# Patient Record
Sex: FEMALE | Race: BLACK OR AFRICAN AMERICAN
[De-identification: names, ages, dates, MRNs, and addresses within clinical notes are randomized per-mention and may not be internally consistent; named-entity substitution may affect disease eponyms.]

---

## 2017-04-04 ENCOUNTER — HOSPITAL ENCOUNTER (EMERGENCY)
Dept: HOSPITAL 23 - CED | Age: 22
Discharge: HOME | End: 2017-04-04
Payer: COMMERCIAL

## 2017-04-04 DIAGNOSIS — J32.9: Primary | ICD-10-CM

## 2017-04-04 DIAGNOSIS — Z88.2: ICD-10-CM

## 2017-05-05 ENCOUNTER — HOSPITAL ENCOUNTER (EMERGENCY)
Dept: HOSPITAL 23 - CED | Age: 22
Discharge: HOME | End: 2017-05-05
Payer: COMMERCIAL

## 2017-05-05 DIAGNOSIS — Z88.2: ICD-10-CM

## 2017-05-05 DIAGNOSIS — N76.0: Primary | ICD-10-CM

## 2017-05-05 DIAGNOSIS — N39.0: ICD-10-CM

## 2017-05-05 DIAGNOSIS — Z88.1: ICD-10-CM

## 2017-05-05 LAB
GENTAMICIN PEAK SERPL-MCNC: YES MG/L
URINE APPEARANCE: (no result)
URINE BACTERIA AUWI: (no result)
URINE BILIRUBIN: (no result)
URINE BLOOD: (no result)
URINE COLOR: (no result)
URINE CRYSTALS: (no result) /[HPF]
URINE GLUCOSE: (no result) MG/DL
URINE KETONE: (no result)
URINE LEUKOCYTE ESTERASE: (no result)
URINE NITRATE: (no result)
URINE PH: 5.5 (ref 5–8)
URINE PROTEIN: (no result)
URINE SOURCE: (no result)
URINE SPECIFIC GRAVITY: 1.03 (ref 1–1.03)
URINE SQUAMOUS EPITHELIAL CELL: (no result) /[HPF]
URINE UROBILINOGEN: 1 MG/DL
UWBCS1 AUWI: (no result) (ref 0–5)

## 2017-05-06 ENCOUNTER — HOSPITAL ENCOUNTER (EMERGENCY)
Dept: HOSPITAL 23 - CED | Age: 22
LOS: 1 days | Discharge: HOME | End: 2017-05-07
Payer: COMMERCIAL

## 2017-05-06 DIAGNOSIS — Z88.2: ICD-10-CM

## 2017-05-06 DIAGNOSIS — N76.0: Primary | ICD-10-CM

## 2017-05-06 DIAGNOSIS — Z88.8: ICD-10-CM

## 2017-05-06 LAB
BARBITURATES UR QL SCN: 0.9 MG/DL (ref 0.2–2)
BARBITURATES UR QL SCN: 4.1 G/DL (ref 3.5–5)
BASOPHIL#: 0 X10E3 (ref 0–0.3)
BASOPHIL%: 0.1 % (ref 0–2.5)
BENZODIAZ UR QL SCN: 16 U/L (ref 10–40)
BENZODIAZ UR QL SCN: 23 U/L (ref 10–42)
BLOOD UREA NITROGEN: 13 MG/DL (ref 9–23)
BUN/CREATININE RATIO: 14.44
BZE UR QL SCN: 64 U/L (ref 32–92)
CALCIUM SERUM: 9.1 MG/DL (ref 8.4–10.2)
CK MB SERPL-RTO: 14 % (ref 11–15.5)
CK MB SERPL-RTO: 32.5 G/DL (ref 30–36)
CREATININE SERUM: 0.9 MG/DL (ref 0.6–1.4)
DIFF IND: NO
EOSINOPHIL#: 0.2 X10E3 (ref 0–0.7)
EOSINOPHIL%: 2.2 % (ref 0–7)
GLOM FILT RATE ESTIMATED: 106 ML/MIN (ref 60–?)
GLUCOSE FASTING: 98 MG/DL (ref 70–110)
HEMATOCRIT: 41.3 % (ref 35–45)
HEMOGLOBIN: 13.4 GM/DL (ref 12–16)
KETONES UR QL: 100 MMOL/L (ref 100–111)
KETONES UR QL: 21 MMOL/L (ref 22–31)
LYMPHOCYTE#: 0.5 X10E3 (ref 1–3.5)
LYMPHOCYTE%: 5.2 % (ref 17–45)
MEAN CELL VOLUME: 77.5 FL (ref 83–96)
MEAN CORPUSCULAR HEMOGLOBIN: 25.2 PG (ref 28–34)
MEAN PLATELET VOLUME: 9 FL (ref 6.5–11.5)
MONOCYTE#: 0.4 X10E3 (ref 0–1)
MONOCYTE%: 4.2 % (ref 3–12)
NEUTROPHIL#: 8.8 X10E3 (ref 1.5–7.1)
NEUTROPHIL%: 88.3 % (ref 40–75)
PLATELET COUNT: 172 X10E3 (ref 140–420)
POTASSIUM: 3.8 MMOL/L (ref 3.5–5.1)
PROTEIN TOTAL SERUM: 7.6 G/DL (ref 6–8.3)
RED BLOOD COUNT: 5.33 X10E (ref 3.9–5.3)
SODIUM: 131 MMOL/L (ref 135–145)
WHITE BLOOD COUNT: 10 X10E3 (ref 4–10.5)

## 2018-09-14 PROCEDURE — 99284 EMERGENCY DEPT VISIT MOD MDM: CPT

## 2018-09-15 ENCOUNTER — HOSPITAL ENCOUNTER (EMERGENCY)
Facility: HOSPITAL | Age: 23
Discharge: HOME | End: 2018-09-15
Attending: EMERGENCY MEDICINE | Admitting: EMERGENCY MEDICINE

## 2018-09-15 VITALS
DIASTOLIC BLOOD PRESSURE: 81 MMHG | HEIGHT: 68 IN | RESPIRATION RATE: 18 BRPM | SYSTOLIC BLOOD PRESSURE: 107 MMHG | OXYGEN SATURATION: 100 % | WEIGHT: 138.5 LBS | TEMPERATURE: 97.8 F | HEART RATE: 73 BPM | BODY MASS INDEX: 20.99 KG/M2

## 2018-09-15 DIAGNOSIS — N75.8: ICD-10-CM

## 2018-09-15 DIAGNOSIS — R23.8 BULLAE: ICD-10-CM

## 2018-09-15 DIAGNOSIS — N89.8 VAGINAL DISCHARGE: Primary | ICD-10-CM

## 2018-09-15 LAB
ALBUMIN SERPL-MCNC: 4.2 G/DL (ref 3.5–5.2)
ALBUMIN/GLOB SERPL: 1.2 G/DL
ALP SERPL-CCNC: 63 U/L (ref 39–117)
ALT SERPL W P-5'-P-CCNC: 18 U/L (ref 1–33)
ANION GAP SERPL CALCULATED.3IONS-SCNC: 13.1 MMOL/L
AST SERPL-CCNC: 19 U/L (ref 1–32)
B-HCG UR QL: NEGATIVE
BASOPHILS # BLD AUTO: 0.03 10*3/MM3 (ref 0–0.2)
BASOPHILS NFR BLD AUTO: 0.5 % (ref 0–1.5)
BILIRUB SERPL-MCNC: 0.2 MG/DL (ref 0.1–1.2)
BILIRUB UR QL STRIP: NEGATIVE
BUN BLD-MCNC: 20 MG/DL (ref 6–20)
BUN/CREAT SERPL: 29.9 (ref 7–25)
CALCIUM SPEC-SCNC: 9.9 MG/DL (ref 8.6–10.5)
CHLORIDE SERPL-SCNC: 104 MMOL/L (ref 98–107)
CLARITY UR: CLEAR
CLUE CELLS SPEC QL WET PREP: ABNORMAL
CO2 SERPL-SCNC: 22.9 MMOL/L (ref 22–29)
COLOR UR: YELLOW
CREAT BLD-MCNC: 0.67 MG/DL (ref 0.57–1)
DEPRECATED RDW RBC AUTO: 43.3 FL (ref 37–54)
EOSINOPHIL # BLD AUTO: 0.2 10*3/MM3 (ref 0–0.7)
EOSINOPHIL NFR BLD AUTO: 3.6 % (ref 0.3–6.2)
ERYTHROCYTE [DISTWIDTH] IN BLOOD BY AUTOMATED COUNT: 14.2 % (ref 11.7–13)
GFR SERPL CREATININE-BSD FRML MDRD: 133 ML/MIN/1.73
GLOBULIN UR ELPH-MCNC: 3.6 GM/DL
GLUCOSE BLD-MCNC: 89 MG/DL (ref 65–99)
GLUCOSE UR STRIP-MCNC: NEGATIVE MG/DL
HCT VFR BLD AUTO: 42.3 % (ref 35.6–45.5)
HGB BLD-MCNC: 12.8 G/DL (ref 11.9–15.5)
HGB UR QL STRIP.AUTO: NEGATIVE
HYDATID CYST SPEC WET PREP: ABNORMAL
IMM GRANULOCYTES # BLD: 0 10*3/MM3 (ref 0–0.03)
IMM GRANULOCYTES NFR BLD: 0 % (ref 0–0.5)
KETONES UR QL STRIP: NEGATIVE
LEUKOCYTE ESTERASE UR QL STRIP.AUTO: NEGATIVE
LYMPHOCYTES # BLD AUTO: 2.19 10*3/MM3 (ref 0.9–4.8)
LYMPHOCYTES NFR BLD AUTO: 39.2 % (ref 19.6–45.3)
MCH RBC QN AUTO: 25.2 PG (ref 26.9–32)
MCHC RBC AUTO-ENTMCNC: 30.3 G/DL (ref 32.4–36.3)
MCV RBC AUTO: 83.4 FL (ref 80.5–98.2)
MONOCYTES # BLD AUTO: 0.27 10*3/MM3 (ref 0.2–1.2)
MONOCYTES NFR BLD AUTO: 4.8 % (ref 5–12)
NEUTROPHILS # BLD AUTO: 2.9 10*3/MM3 (ref 1.9–8.1)
NEUTROPHILS NFR BLD AUTO: 51.9 % (ref 42.7–76)
NITRITE UR QL STRIP: NEGATIVE
PH UR STRIP.AUTO: 6 [PH] (ref 5–8)
PLATELET # BLD AUTO: 205 10*3/MM3 (ref 140–500)
PMV BLD AUTO: 10.6 FL (ref 6–12)
POTASSIUM BLD-SCNC: 4 MMOL/L (ref 3.5–5.2)
PROT SERPL-MCNC: 7.8 G/DL (ref 6–8.5)
PROT UR QL STRIP: NEGATIVE
RBC # BLD AUTO: 5.07 10*6/MM3 (ref 3.9–5.2)
SODIUM BLD-SCNC: 140 MMOL/L (ref 136–145)
SP GR UR STRIP: >=1.03 (ref 1–1.03)
T VAGINALIS SPEC QL WET PREP: ABNORMAL
UROBILINOGEN UR QL STRIP: NORMAL
WBC NRBC COR # BLD: 5.59 10*3/MM3 (ref 4.5–10.7)
WBC SPEC QL WET PREP: ABNORMAL
YEAST GENITAL QL WET PREP: ABNORMAL

## 2018-09-15 PROCEDURE — 25010000002 CEFTRIAXONE PER 250 MG: Performed by: PHYSICIAN ASSISTANT

## 2018-09-15 PROCEDURE — 87210 SMEAR WET MOUNT SALINE/INK: CPT | Performed by: PHYSICIAN ASSISTANT

## 2018-09-15 PROCEDURE — 87591 N.GONORRHOEAE DNA AMP PROB: CPT | Performed by: PHYSICIAN ASSISTANT

## 2018-09-15 PROCEDURE — 87491 CHLMYD TRACH DNA AMP PROBE: CPT | Performed by: PHYSICIAN ASSISTANT

## 2018-09-15 PROCEDURE — 87070 CULTURE OTHR SPECIMN AEROBIC: CPT | Performed by: PHYSICIAN ASSISTANT

## 2018-09-15 PROCEDURE — 80053 COMPREHEN METABOLIC PANEL: CPT | Performed by: PHYSICIAN ASSISTANT

## 2018-09-15 PROCEDURE — 85025 COMPLETE CBC W/AUTO DIFF WBC: CPT | Performed by: PHYSICIAN ASSISTANT

## 2018-09-15 PROCEDURE — 81003 URINALYSIS AUTO W/O SCOPE: CPT | Performed by: PHYSICIAN ASSISTANT

## 2018-09-15 PROCEDURE — 81025 URINE PREGNANCY TEST: CPT | Performed by: PHYSICIAN ASSISTANT

## 2018-09-15 PROCEDURE — 87205 SMEAR GRAM STAIN: CPT | Performed by: PHYSICIAN ASSISTANT

## 2018-09-15 PROCEDURE — 96372 THER/PROPH/DIAG INJ SC/IM: CPT

## 2018-09-15 RX ORDER — METRONIDAZOLE 500 MG/1
500 TABLET ORAL 3 TIMES DAILY
Qty: 21 TABLET | Refills: 0 | Status: SHIPPED | OUTPATIENT
Start: 2018-09-15

## 2018-09-15 RX ORDER — LIDOCAINE HYDROCHLORIDE 10 MG/ML
0.9 INJECTION, SOLUTION EPIDURAL; INFILTRATION; INTRACAUDAL; PERINEURAL ONCE
Status: COMPLETED | OUTPATIENT
Start: 2018-09-15 | End: 2018-09-15

## 2018-09-15 RX ORDER — CEFTRIAXONE SODIUM 250 MG/1
250 INJECTION, POWDER, FOR SOLUTION INTRAMUSCULAR; INTRAVENOUS ONCE
Status: COMPLETED | OUTPATIENT
Start: 2018-09-15 | End: 2018-09-15

## 2018-09-15 RX ORDER — AZITHROMYCIN 250 MG/1
1000 TABLET, FILM COATED ORAL ONCE
Status: COMPLETED | OUTPATIENT
Start: 2018-09-15 | End: 2018-09-15

## 2018-09-15 RX ORDER — DOXYCYCLINE HYCLATE 100 MG/1
100 CAPSULE ORAL 2 TIMES DAILY
Qty: 20 CAPSULE | Refills: 0 | Status: SHIPPED | OUTPATIENT
Start: 2018-09-15

## 2018-09-15 RX ADMIN — AZITHROMYCIN 1000 MG: 250 TABLET, FILM COATED ORAL at 06:22

## 2018-09-15 RX ADMIN — LIDOCAINE HYDROCHLORIDE 0.9 ML: 10 INJECTION, SOLUTION EPIDURAL; INFILTRATION; INTRACAUDAL; PERINEURAL at 04:23

## 2018-09-15 RX ADMIN — CEFTRIAXONE SODIUM 250 MG: 250 INJECTION, POWDER, FOR SOLUTION INTRAMUSCULAR; INTRAVENOUS at 04:23

## 2018-09-15 NOTE — ED NOTES
"Pt reports abscess to right labia x1 day. Denies fever. Pt c/o vaginal \"milky\" discharge. Also c/o possible spider bite to left foot since yesterday. Pt states \"I went to the [facility] yesterday and they gave me cream but it's not helping.\"                Swallows, Anuradha Rai, RN  09/14/18 9518    "

## 2018-09-15 NOTE — ED PROVIDER NOTES
" EMERGENCY DEPARTMENT ENCOUNTER    CHIEF COMPLAINT  Chief Complaint: Abscess  History given by: Patient  History limited by:   Room Number: 26/26  PMD: Traci Dumont APRN      HPI:  Pt is a 23 y.o. female who presents complaining of abscess.on right labia majora that was first noticed yesterday. Pt also reports vaginal discharge that started yesterday.  Pt also reports a similar abscess on left foot. Pt denied fever. Pt denies possible STI exposure. Hx of Bacterial Vaginosis.        Duration:  One day  Onset: gradual  Timing: constant  Location: right labia majora and left foot  Radiation: none  Quality: \"pain\"  Intensity/Severity: severe  Progression: worsened  Associated Symptoms: discharge  Aggravating Factors: none  Alleviating Factors: none  Previous Episodes: none  Treatment before arrival:     PAST MEDICAL HISTORY  Active Ambulatory Problems     Diagnosis Date Noted   • No Active Ambulatory Problems     Resolved Ambulatory Problems     Diagnosis Date Noted   • No Resolved Ambulatory Problems     No Additional Past Medical History       PAST SURGICAL HISTORY  No past surgical history on file.    FAMILY HISTORY  No family history on file.    SOCIAL HISTORY  Social History     Social History   • Marital status: Single     Spouse name: N/A   • Number of children: N/A   • Years of education: N/A     Occupational History   • Not on file.     Social History Main Topics   • Smoking status: Not on file   • Smokeless tobacco: Not on file   • Alcohol use Not on file   • Drug use: Unknown   • Sexual activity: Not on file     Other Topics Concern   • Not on file     Social History Narrative   • No narrative on file       ALLERGIES  Bactrim [sulfamethoxazole-trimethoprim]    REVIEW OF SYSTEMS  Review of Systems   Constitutional: Negative for fever.   Respiratory: Negative for shortness of breath.    Cardiovascular: Negative for chest pain.   Genitourinary: Positive for vaginal discharge.        Abscess       PHYSICAL " EXAM  ED Triage Vitals   Temp Heart Rate Resp BP SpO2   09/14/18 2339 09/14/18 2339 09/14/18 2339 09/15/18 0127 09/14/18 2339   97.9 °F (36.6 °C) 103 20 116/87 100 %      Temp src Heart Rate Source Patient Position BP Location FiO2 (%)   09/15/18 0239 09/15/18 0239 -- 09/15/18 0239 --   Oral Monitor  Right arm        Physical Exam   Constitutional: She is oriented to person, place, and time. No distress.   Eyes: EOM are normal.   Neck: Normal range of motion.   Cardiovascular: Normal rate and regular rhythm.    Pulmonary/Chest: Effort normal and breath sounds normal. No respiratory distress.   Genitourinary: Cervix exhibits no motion tenderness. Thick  odorless  white and vaginal discharge found.   Genitourinary Comments: Female chaperone present during  exam. Mild tenderness to the right labia along the Bartholin's gland       Neurological: She is alert and oriented to person, place, and time. She has normal sensation and normal strength.   Skin: Skin is warm and dry.   Psychiatric: Affect normal.   Nursing note and vitals reviewed.      LAB RESULTS  Lab Results (last 24 hours)     Procedure Component Value Units Date/Time    Pregnancy, Urine - Urine, Clean Catch [[id number]]  (Normal) Collected:  09/15/18 0344    Specimen:  Urine from Urine, Clean Catch Updated:  09/15/18 0422     HCG, Urine QL Negative    Urinalysis With Microscopic If Indicated (No Culture) - Urine, Clean Catch [[id number]]  (Normal) Collected:  09/15/18 0344    Specimen:  Urine from Urine, Clean Catch Updated:  09/15/18 0422     Color, UA Yellow     Appearance, UA Clear     pH, UA 6.0     Specific Gravity, UA >=1.030     Glucose, UA Negative     Ketones, UA Negative     Bilirubin, UA Negative     Blood, UA Negative     Protein, UA Negative     Leuk Esterase, UA Negative     Nitrite, UA Negative     Urobilinogen, UA 0.2 E.U./dL    Narrative:       Urine microscopic not indicated.    CBC & Differential [[id number]] Collected:  09/15/18 0356     Specimen:  Blood Updated:  09/15/18 0417    Narrative:       The following orders were created for panel order CBC & Differential.  Procedure                               Abnormality         Status                     ---------                               -----------         ------                     CBC Auto Differential[[id number]]        Abnormal            Final result                 Please view results for these tests on the individual orders.    Comprehensive Metabolic Panel [[id number]]  (Abnormal) Collected:  09/15/18 0357    Specimen:  Blood Updated:  09/15/18 0442     Glucose 89 mg/dL      BUN 20 mg/dL      Creatinine 0.67 mg/dL      Sodium 140 mmol/L      Potassium 4.0 mmol/L      Chloride 104 mmol/L      CO2 22.9 mmol/L      Calcium 9.9 mg/dL      Total Protein 7.8 g/dL      Albumin 4.20 g/dL      ALT (SGPT) 18 U/L      AST (SGOT) 19 U/L      Alkaline Phosphatase 63 U/L      Total Bilirubin 0.2 mg/dL      eGFR  African Amer 133 mL/min/1.73      Globulin 3.6 gm/dL      A/G Ratio 1.2 g/dL      BUN/Creatinine Ratio 29.9 (H)     Anion Gap 13.1 mmol/L     CBC Auto Differential [[id number]]  (Abnormal) Collected:  09/15/18 0357    Specimen:  Blood Updated:  09/15/18 0417     WBC 5.59 10*3/mm3      RBC 5.07 10*6/mm3      Hemoglobin 12.8 g/dL      Hematocrit 42.3 %      MCV 83.4 fL      MCH 25.2 (L) pg      MCHC 30.3 (L) g/dL      RDW 14.2 (H) %      RDW-SD 43.3 fl      MPV 10.6 fL      Platelets 205 10*3/mm3      Neutrophil % 51.9 %      Lymphocyte % 39.2 %      Monocyte % 4.8 (L) %      Eosinophil % 3.6 %      Basophil % 0.5 %      Immature Grans % 0.0 %      Neutrophils, Absolute 2.90 10*3/mm3      Lymphocytes, Absolute 2.19 10*3/mm3      Monocytes, Absolute 0.27 10*3/mm3      Eosinophils, Absolute 0.20 10*3/mm3      Basophils, Absolute 0.03 10*3/mm3      Immature Grans, Absolute 0.00 10*3/mm3     Chlamydia trachomatis, Neisseria gonorrhoeae, PCR - Swab, Cervix [[id number]] Collected:  09/15/18 0415     Specimen:  Swab from Cervix Updated:  09/15/18 0423    Wet Prep, Genital - Swab, Vagina [[id number]]  (Abnormal) Collected:  09/15/18 0415    Specimen:  Swab from Vagina Updated:  09/15/18 0614     YEAST No yeast seen     HYPHAL ELEMENTS No Hyphal elements seen     WBC'S 1+ WBC's seen (A)     Clue Cells, Wet Prep No Clue cells seen     Trichomonas, Wet Prep No Trichomonas seen    Wound Culture - Wound, Ankle, Right [[id number]] Collected:  09/15/18 0419    Specimen:  Wound from Ankle, Right Updated:  09/15/18 0424          I ordered the above labs and reviewed the results      PROCEDURES  Incision & Drainage  Date/Time: 9/15/2018 4:16 AM  Performed by: MICHELLE BRAUN III  Authorized by: KOKI CARMICHAEL     Consent:     Consent obtained:  Verbal    Consent given by:  Patient  Location:     Location:  Lower extremity    Lower extremity location:  Ankle    Ankle location:  L ankle  Pre-procedure details:     Skin preparation:  Betadine  Anesthesia (see MAR for exact dosages):     Anesthesia method:  None  Procedure type:     Complexity:  Simple  Procedure details:     Incision types:  Single straight    Incision depth:  Dermal    Drainage:  Serous    Drainage amount:  Scant    Packing materials:  None  Post-procedure details:     Patient tolerance of procedure:  Tolerated well, no immediate complications          PROGRESS AND CONSULTS     0350  Pt rechecked. Plan to culture discharge and order antibiotics. Ordered Rocephin abx.    0415  I&D on Left ankle abscess. Pelvic exam and cultures performed on Pt. Female chaperone present.     4:44 AM  Reviewed pt's history and workup with Dr. Carmichael.  After a bedside evaluation; Dr Carmichael agrees with the plan of care        MEDICAL DECISION MAKING  Results were reviewed/discussed with the patient and they were also made aware of online access. Pt also made aware that some labs, such as cultures, will not be resulted during ER visit and follow up with PMD is  necessary.     MDM  Number of Diagnoses or Management Options     Amount and/or Complexity of Data Reviewed  Clinical lab tests: ordered and reviewed           DIAGNOSIS  Final diagnoses:   Vaginal discharge   Infection of Bartholin's gland   Bullae (left leg)       DISPOSITION  DISCHARGE    Patient discharged in stable condition.    Reviewed implications of results, diagnosis, meds, responsibility to follow up, warning signs and symptoms of possible worsening, potential complications and reasons to return to ER.    Patient/Family voiced understanding of above instructions.    Discussed plan for discharge, as there is no emergent indication for admission. Patient referred to primary care provider for BP management due to today's BP. Pt/family is agreeable and understands need for follow up and repeat testing.  Pt is aware that discharge does not mean that nothing is wrong but it indicates no emergency is present that requires admission and they must continue care with follow-up as given below or physician of their choice.     FOLLOW-UP  Neeru Gauthier MD  [address] [facility] [id number] [phone]    Schedule an appointment as soon as possible for a visit   For further evaluation and treatment         Medication List      New Prescriptions    doxycycline 100 MG capsule  Commonly known as:  VIBRAMYCIN  Take 1 capsule by mouth 2 (Two) Times a Day.     metroNIDAZOLE 500 MG tablet  Commonly known as:  FLAGYL  Take 1 tablet by mouth 3 (Three) Times a Day.              Latest Documented Vital Signs:  As of 6:36 AM  BP- 107/81 HR- 73 Temp- 97.8 °F (36.6 °C) (Oral) O2 sat- 100%    --  Documentation assistance provided by valentina French and Alejo Salmeron for Jean-Claude Malloy PA-C.  Information recorded by the valentina was done at my direction and has been verified and validated by me.     Manuel Salmeron  09/15/18 0623       Naren Malloy III, PA  09/15/18 0636

## 2018-09-15 NOTE — DISCHARGE INSTRUCTIONS
Sitz baths until resolve.  Return to the ER with any further concerns, should your condition change/worsen, or should you develop a fever.

## 2018-09-15 NOTE — ED PROVIDER NOTES
Pt presents to the ED c/o an abcess to her labia that she noticed today. Pt also reports white vaginal discharge, she denies abd pain, fever, chills, or any other sx. On exam, Pt is resting comfortably, in no distress, and without focal neurologic deficit. Cardiovascular exam is within normal limits and without murmur. Abd is soft and non tender.  exam deferred, refer to PA-C documentation. I agree with the plan for discharge    Attestation:  The KAILASH and I have discussed this patient's history, physical exam, and treatment plan.  I have reviewed the documentation and personally had a face to face interaction with the patient. I affirm the documentation and agree with the treatment and plan.  The attached note describes my personal findings.      Documentation assistance provided by valentina Monroy for Dr Carmichael Information recorded by the scribe was done at my direction and has been verified and validated by me.        Gisselle Monroy  09/15/18 0544       Dale Carmichael MD  09/15/18 0693

## 2018-09-17 LAB
BACTERIA SPEC AEROBE CULT: NORMAL
GRAM STN SPEC: NORMAL

## 2018-09-18 LAB
C TRACH RRNA SPEC DONR QL NAA+PROBE: NEGATIVE
N GONORRHOEA DNA SPEC QL NAA+PROBE: NEGATIVE